# Patient Record
Sex: MALE | Race: WHITE | NOT HISPANIC OR LATINO | Employment: OTHER | URBAN - METROPOLITAN AREA
[De-identification: names, ages, dates, MRNs, and addresses within clinical notes are randomized per-mention and may not be internally consistent; named-entity substitution may affect disease eponyms.]

---

## 2017-03-08 ENCOUNTER — IMPORTED ENCOUNTER (OUTPATIENT)
Dept: URBAN - METROPOLITAN AREA CLINIC 9 | Facility: CLINIC | Age: 67
End: 2017-03-08

## 2018-05-15 ENCOUNTER — IMPORTED ENCOUNTER (OUTPATIENT)
Dept: URBAN - METROPOLITAN AREA CLINIC 9 | Facility: CLINIC | Age: 68
End: 2018-05-15

## 2019-06-12 ENCOUNTER — IMPORTED ENCOUNTER (OUTPATIENT)
Dept: URBAN - METROPOLITAN AREA CLINIC 9 | Facility: CLINIC | Age: 69
End: 2019-06-12

## 2020-08-26 ENCOUNTER — IMPORTED ENCOUNTER (OUTPATIENT)
Dept: URBAN - METROPOLITAN AREA CLINIC 9 | Facility: CLINIC | Age: 70
End: 2020-08-26

## 2021-06-17 ENCOUNTER — IMPORTED ENCOUNTER (OUTPATIENT)
Dept: URBAN - METROPOLITAN AREA CLINIC 9 | Facility: CLINIC | Age: 71
End: 2021-06-17

## 2021-07-01 ENCOUNTER — IMPORTED ENCOUNTER (OUTPATIENT)
Dept: URBAN - METROPOLITAN AREA CLINIC 9 | Facility: CLINIC | Age: 71
End: 2021-07-01

## 2021-07-06 ENCOUNTER — IMPORTED ENCOUNTER (OUTPATIENT)
Dept: URBAN - METROPOLITAN AREA CLINIC 9 | Facility: CLINIC | Age: 71
End: 2021-07-06

## 2021-07-08 ENCOUNTER — IMPORTED ENCOUNTER (OUTPATIENT)
Dept: URBAN - METROPOLITAN AREA CLINIC 9 | Facility: CLINIC | Age: 71
End: 2021-07-08

## 2021-07-26 ENCOUNTER — IMPORTED ENCOUNTER (OUTPATIENT)
Dept: URBAN - METROPOLITAN AREA CLINIC 9 | Facility: CLINIC | Age: 71
End: 2021-07-26

## 2021-08-02 ENCOUNTER — IMPORTED ENCOUNTER (OUTPATIENT)
Dept: URBAN - METROPOLITAN AREA CLINIC 9 | Facility: CLINIC | Age: 71
End: 2021-08-02

## 2021-08-05 ENCOUNTER — IMPORTED ENCOUNTER (OUTPATIENT)
Dept: URBAN - METROPOLITAN AREA CLINIC 9 | Facility: CLINIC | Age: 71
End: 2021-08-05

## 2021-08-09 ENCOUNTER — IMPORTED ENCOUNTER (OUTPATIENT)
Dept: URBAN - METROPOLITAN AREA CLINIC 9 | Facility: CLINIC | Age: 71
End: 2021-08-09

## 2021-09-14 ENCOUNTER — IMPORTED ENCOUNTER (OUTPATIENT)
Dept: URBAN - METROPOLITAN AREA CLINIC 9 | Facility: CLINIC | Age: 71
End: 2021-09-14

## 2021-09-14 PROBLEM — Z96.1: Noted: 2021-09-14

## 2021-09-14 PROBLEM — Z98.41: Noted: 2021-07-07

## 2021-09-14 PROBLEM — Z98.42: Noted: 2021-06-30

## 2021-10-19 ASSESSMENT — VISUAL ACUITY
OS_CC: 20/25 SN
OD_CC: 20/20 SN
OS_CC: 20/50 SN
OS_SC: 20/50 SN
OD_SC: 20/25 + SN
OS_SC: 20/25 SN
OS_PH: 20/50 SN
OD_CC: 20/25 SN
OD_CC: 20/15 SN
OS_SC: 20/30 + SN
OD_PH: 20/30 SN
OS_SC: 20/25 - SN
OD_SC: 20/25 SN
OD_CC: 20/25 SN
OS_SC: 20/25 - SN
OD_CC: 20/20 SN
OS_CC: 20/20 SN
OS_SC: 20/40 +2 SN
OD_SC: 20/50 SN
OD_SC: 20/15 SN
OD_CC: 20/20 - SN
OS_SC: 20/30 - SN
OD_SC: 20/40 SN
OD_CC: 20/30 SN
OD_SC: 20/40 SN
OD_SC: 20/15 SN
OD_SC: 20/50 - SN
OS_CC: 20/20 SN
OS_SC: 20/30 SN
OD_SC: 20/50 SN
OS_SC: 20/15 SN
OS_SC: 20/50 SN
OD_CC: 20/30 SN
OS_CC: 20/25 SN
OS_CC: 20/15 SN
OS_SC: 20/15 SN
OS_CC: 20/20 SN
OS_CC: 20/20 -2 SN
OS_SC: 20/40 SN
OD_CC: 20/25 SN
OD_SC: 20/25 SN

## 2021-10-19 ASSESSMENT — TONOMETRY
OD_IOP_MMHG: 12
OS_IOP_MMHG: 13
OD_IOP_MMHG: 13
OS_IOP_MMHG: 23
OS_IOP_MMHG: 16
OS_IOP_MMHG: 14
OD_IOP_MMHG: 15
OS_IOP_MMHG: 17
OS_IOP_MMHG: 14
OD_IOP_MMHG: 16
OS_IOP_MMHG: 14
OD_IOP_MMHG: 18
OD_IOP_MMHG: 20
OD_IOP_MMHG: 14
OS_IOP_MMHG: 11
OS_IOP_MMHG: 17

## 2021-10-19 ASSESSMENT — KERATOMETRY
OS_K1POWER_DIOPTERS: 41.5
OS_AXISANGLE2_DEGREES: 76
OD_AXISANGLE_DEGREES: 180
OS_AXISANGLE_DEGREES: 166
OD_K2POWER_DIOPTERS: 42
OD_AXISANGLE2_DEGREES: 90
OS_K2POWER_DIOPTERS: 42.25
OD_K1POWER_DIOPTERS: 42

## 2021-12-16 ENCOUNTER — ESTABLISHED PATIENT (OUTPATIENT)
Dept: URBAN - METROPOLITAN AREA CLINIC 14 | Facility: CLINIC | Age: 71
End: 2021-12-16

## 2021-12-16 DIAGNOSIS — Z96.1: ICD-10-CM

## 2021-12-16 PROCEDURE — 99024 POSTOP FOLLOW-UP VISIT: CPT

## 2021-12-16 ASSESSMENT — TONOMETRY
OS_IOP_MMHG: 18
OS_IOP_MMHG: 18
OD_IOP_MMHG: 18
OD_IOP_MMHG: 15

## 2021-12-16 ASSESSMENT — VISUAL ACUITY
OS_SC: 20/20-2
OD_SC: 20/25-2

## 2022-07-07 RX ORDER — HYDROCHLOROTHIAZIDE 25 MG/1
TABLET ORAL
COMMUNITY
End: 2022-09-29 | Stop reason: SDUPTHER

## 2022-07-07 RX ORDER — AMLODIPINE BESYLATE 10 MG/1
TABLET ORAL
COMMUNITY
End: 2022-09-29 | Stop reason: SDUPTHER

## 2022-07-07 RX ORDER — ASCORBIC ACID 500 MG
TABLET ORAL
COMMUNITY

## 2022-07-07 RX ORDER — TELMISARTAN 80 MG/1
TABLET ORAL
COMMUNITY
End: 2022-09-29 | Stop reason: SDUPTHER

## 2022-07-07 RX ORDER — HYDROCODONE/ACETAMINOPHEN 5 MG-500MG
TABLET ORAL
COMMUNITY

## 2022-07-07 RX ORDER — ROSUVASTATIN CALCIUM 10 MG/1
TABLET, COATED ORAL
COMMUNITY
End: 2022-09-29 | Stop reason: SDUPTHER

## 2022-07-07 RX ORDER — UBIDECARENONE 100 MG
CAPSULE ORAL
COMMUNITY

## 2022-07-07 RX ORDER — LEVOTHYROXINE SODIUM 0.03 MG/1
TABLET ORAL
COMMUNITY
Start: 2022-03-29 | End: 2022-09-29 | Stop reason: SDUPTHER

## 2022-07-16 PROBLEM — E03.8 SUBCLINICAL HYPOTHYROIDISM: Status: ACTIVE | Noted: 2022-07-16

## 2022-07-16 PROBLEM — R01.1 HEART MURMUR: Status: ACTIVE | Noted: 2022-07-16

## 2022-07-16 PROBLEM — I10 ESSENTIAL HYPERTENSION: Status: ACTIVE | Noted: 2022-07-16

## 2022-07-16 PROBLEM — J30.2 SEASONAL ALLERGIC RHINITIS: Status: ACTIVE | Noted: 2022-07-16

## 2022-07-16 PROBLEM — F32.9 MAJOR DEPRESSION, SINGLE EPISODE: Status: ACTIVE | Noted: 2022-07-16

## 2022-07-16 PROBLEM — Z80.0 FAMILY HISTORY OF CANCER OF DIGESTIVE ORGAN: Status: ACTIVE | Noted: 2022-07-16

## 2022-07-16 PROBLEM — Z80.42 FAMILY HISTORY OF PROSTATE CANCER: Status: ACTIVE | Noted: 2022-07-16

## 2022-07-16 PROBLEM — R94.30 ABNORMAL RESULT OF CARDIOVASCULAR FUNCTION STUDY: Status: ACTIVE | Noted: 2022-07-16

## 2022-07-16 PROBLEM — Z95.5 PRESENCE OF CORONARY ANGIOPLASTY IMPLANT AND GRAFT: Status: ACTIVE | Noted: 2022-07-16

## 2022-07-16 PROBLEM — G47.33 OBSTRUCTIVE SLEEP APNEA SYNDROME: Status: ACTIVE | Noted: 2022-07-16

## 2022-07-16 PROBLEM — D09.9 SQUAMOUS CELL CARCINOMA IN SITU: Status: ACTIVE | Noted: 2022-07-16

## 2022-07-16 PROBLEM — M79.673 FOOT PAIN: Status: ACTIVE | Noted: 2022-07-16

## 2022-07-16 PROBLEM — E78.5 HYPERLIPIDEMIA: Status: ACTIVE | Noted: 2022-07-16

## 2022-07-16 PROBLEM — I25.10 CORONARY ATHEROSCLEROSIS: Status: ACTIVE | Noted: 2022-07-16

## 2022-07-16 PROBLEM — H01.009 BLEPHARITIS: Status: ACTIVE | Noted: 2022-07-16

## 2022-07-16 PROBLEM — G89.29 OTHER CHRONIC PAIN: Status: ACTIVE | Noted: 2022-07-16

## 2022-07-22 ENCOUNTER — MOHS SURGERY-ROUTINE (OUTPATIENT)
Dept: URBAN - METROPOLITAN AREA CLINIC 12 | Facility: CLINIC | Age: 72
Setting detail: DERMATOLOGY
End: 2022-07-22

## 2022-10-13 PROBLEM — Z95.5 HISTORY OF CORONARY ARTERY STENT PLACEMENT: Status: ACTIVE | Noted: 2022-10-13

## 2023-07-17 ENCOUNTER — APPOINTMENT (OUTPATIENT)
Dept: URBAN - METROPOLITAN AREA SURGERY 15 | Age: 73
Setting detail: DERMATOLOGY
End: 2023-07-18

## 2023-07-17 PROBLEM — C44.311 BASAL CELL CARCINOMA OF SKIN OF NOSE: Status: ACTIVE | Noted: 2023-07-17

## 2023-07-17 PROCEDURE — 17311 MOHS 1 STAGE H/N/HF/G: CPT

## 2023-07-17 PROCEDURE — 13152 CMPLX RPR E/N/E/L 2.6-7.5 CM: CPT

## 2023-07-17 PROCEDURE — OTHER SURGICAL DECISION MAKING: OTHER

## 2023-07-17 PROCEDURE — OTHER MOHS SURGERY: OTHER

## 2023-07-17 PROCEDURE — OTHER COUNSELING: OTHER

## 2023-07-17 NOTE — PROCEDURE: MOHS SURGERY
Body Location Override (Optional - Billing Will Still Be Based On Selected Body Map Location If Applicable): nasal tip/ left nasal tip

## 2023-07-17 NOTE — PROCEDURE: MOHS SURGERY
No Bcc Micronodular Histology Text: There were small aggregates of basaloid cells demonstrating a micro nodular pattern.

## 2023-07-17 NOTE — PROCEDURE: SURGICAL DECISION MAKING
Risk Assessment Explanation (Does Not Render In The Note): Clinical determination of the probability and/or consequences of an event, such as surgery. Clinical assessment of the level of risk is affected by the nature of the event under consideration for the patient. Modifier 57 is used to indicate an Evaluation and Management (E/M) service resulted in the initial decision to perform surgery either the day before a major surgery (90 day global) or the day of a major surgery.
Date Of Surgery - Today Or Tomorrow?: today
Complexity (Necessary For Coding; Major - 90 Day Global With Some Exceptions; Minor - 10 Day Global): major
Discussion: After the mohs procedure was complete, a separate and distinct evaluation and management was performed for the resultant surgical defect for potential reconstruction using flap or graft.  Complications and risk of morbidity of each were discussed including (but not limited to) scarring, which could distort a free anatomic margin of the eyelid, nose, ear or lip.
Identified Risk Factors Documented?: yes

## 2023-07-24 ENCOUNTER — APPOINTMENT (OUTPATIENT)
Dept: URBAN - METROPOLITAN AREA SURGERY 15 | Age: 73
Setting detail: DERMATOLOGY
End: 2023-07-25

## 2023-07-24 DIAGNOSIS — Z48.02 ENCOUNTER FOR REMOVAL OF SUTURES: ICD-10-CM

## 2023-07-24 PROCEDURE — OTHER SUTURE REMOVAL (GLOBAL PERIOD): OTHER

## 2023-07-24 PROCEDURE — 99024 POSTOP FOLLOW-UP VISIT: CPT

## 2023-07-24 ASSESSMENT — LOCATION DETAILED DESCRIPTION DERM: LOCATION DETAILED: NASAL SUPRATIP

## 2023-07-24 ASSESSMENT — LOCATION ZONE DERM: LOCATION ZONE: NOSE

## 2023-07-24 ASSESSMENT — LOCATION SIMPLE DESCRIPTION DERM: LOCATION SIMPLE: NOSE

## 2023-07-24 NOTE — PROCEDURE: SUTURE REMOVAL (GLOBAL PERIOD)
Detail Level: Detailed
Add 63534 Cpt? (Important Note: In 2017 The Use Of 06157 Is Being Tracked By Cms To Determine Future Global Period Reimbursement For Global Periods): yes

## 2023-12-05 ENCOUNTER — APPOINTMENT (OUTPATIENT)
Dept: URBAN - METROPOLITAN AREA SURGERY 15 | Age: 73
Setting detail: DERMATOLOGY
End: 2023-12-05

## 2023-12-05 DIAGNOSIS — L90.5 SCAR CONDITIONS AND FIBROSIS OF SKIN: ICD-10-CM

## 2023-12-05 DIAGNOSIS — L57.8 OTHER SKIN CHANGES DUE TO CHRONIC EXPOSURE TO NONIONIZING RADIATION: ICD-10-CM

## 2023-12-05 DIAGNOSIS — Z85.828 PERSONAL HISTORY OF OTHER MALIGNANT NEOPLASM OF SKIN: ICD-10-CM

## 2023-12-05 DIAGNOSIS — I78.8 OTHER DISEASES OF CAPILLARIES: ICD-10-CM

## 2023-12-05 PROCEDURE — OTHER MIPS QUALITY: OTHER

## 2023-12-05 PROCEDURE — OTHER REASSURANCE: OTHER

## 2023-12-05 PROCEDURE — OTHER OBSERVATION: OTHER

## 2023-12-05 PROCEDURE — 99213 OFFICE O/P EST LOW 20 MIN: CPT

## 2023-12-05 PROCEDURE — OTHER COUNSELING: OTHER

## 2023-12-05 ASSESSMENT — LOCATION ZONE DERM
LOCATION ZONE: NOSE
LOCATION ZONE: NOSE

## 2023-12-05 ASSESSMENT — LOCATION SIMPLE DESCRIPTION DERM
LOCATION SIMPLE: NOSE
LOCATION SIMPLE: NOSE

## 2023-12-05 ASSESSMENT — LOCATION DETAILED DESCRIPTION DERM
LOCATION DETAILED: NASAL DORSUM
LOCATION DETAILED: NASAL DORSUM

## 2023-12-05 NOTE — PROCEDURE: OBSERVATION
Detail Level: Detailed
X Size Of Lesion In Cm (Optional): 0
Body Location Override (Optional - Billing Will Still Be Based On Selected Body Map Location If Applicable): nasal tip/left nasal tip